# Patient Record
Sex: FEMALE | Race: WHITE | NOT HISPANIC OR LATINO | Employment: STUDENT | URBAN - METROPOLITAN AREA
[De-identification: names, ages, dates, MRNs, and addresses within clinical notes are randomized per-mention and may not be internally consistent; named-entity substitution may affect disease eponyms.]

---

## 2022-11-14 ENCOUNTER — OFFICE VISIT (OUTPATIENT)
Dept: URGENT CARE | Facility: CLINIC | Age: 22
End: 2022-11-14
Payer: COMMERCIAL

## 2022-11-14 VITALS
RESPIRATION RATE: 16 BRPM | BODY MASS INDEX: 23.04 KG/M2 | HEART RATE: 89 BPM | WEIGHT: 130 LBS | HEIGHT: 63 IN | TEMPERATURE: 99 F | OXYGEN SATURATION: 99 % | DIASTOLIC BLOOD PRESSURE: 74 MMHG | SYSTOLIC BLOOD PRESSURE: 126 MMHG

## 2022-11-14 DIAGNOSIS — S93.402A SPRAIN OF LEFT ANKLE, UNSPECIFIED LIGAMENT, INITIAL ENCOUNTER: ICD-10-CM

## 2022-11-14 DIAGNOSIS — S99.912A INJURY OF LEFT ANKLE, INITIAL ENCOUNTER: Primary | ICD-10-CM

## 2022-11-14 PROCEDURE — 3008F BODY MASS INDEX DOCD: CPT | Mod: CPTII,S$GLB,, | Performed by: NURSE PRACTITIONER

## 2022-11-14 PROCEDURE — 1159F MED LIST DOCD IN RCRD: CPT | Mod: CPTII,S$GLB,, | Performed by: NURSE PRACTITIONER

## 2022-11-14 PROCEDURE — 3008F PR BODY MASS INDEX (BMI) DOCUMENTED: ICD-10-PCS | Mod: CPTII,S$GLB,, | Performed by: NURSE PRACTITIONER

## 2022-11-14 PROCEDURE — 3078F PR MOST RECENT DIASTOLIC BLOOD PRESSURE < 80 MM HG: ICD-10-PCS | Mod: CPTII,S$GLB,, | Performed by: NURSE PRACTITIONER

## 2022-11-14 PROCEDURE — 3074F SYST BP LT 130 MM HG: CPT | Mod: CPTII,S$GLB,, | Performed by: NURSE PRACTITIONER

## 2022-11-14 PROCEDURE — 99204 PR OFFICE/OUTPT VISIT, NEW, LEVL IV, 45-59 MIN: ICD-10-PCS | Mod: S$GLB,,, | Performed by: NURSE PRACTITIONER

## 2022-11-14 PROCEDURE — 1160F RVW MEDS BY RX/DR IN RCRD: CPT | Mod: CPTII,S$GLB,, | Performed by: NURSE PRACTITIONER

## 2022-11-14 PROCEDURE — 1160F PR REVIEW ALL MEDS BY PRESCRIBER/CLIN PHARMACIST DOCUMENTED: ICD-10-PCS | Mod: CPTII,S$GLB,, | Performed by: NURSE PRACTITIONER

## 2022-11-14 PROCEDURE — 3074F PR MOST RECENT SYSTOLIC BLOOD PRESSURE < 130 MM HG: ICD-10-PCS | Mod: CPTII,S$GLB,, | Performed by: NURSE PRACTITIONER

## 2022-11-14 PROCEDURE — 73610 XR ANKLE COMPLETE 3 VIEW LEFT: ICD-10-PCS | Mod: LT,S$GLB,, | Performed by: RADIOLOGY

## 2022-11-14 PROCEDURE — 1159F PR MEDICATION LIST DOCUMENTED IN MEDICAL RECORD: ICD-10-PCS | Mod: CPTII,S$GLB,, | Performed by: NURSE PRACTITIONER

## 2022-11-14 PROCEDURE — 99204 OFFICE O/P NEW MOD 45 MIN: CPT | Mod: S$GLB,,, | Performed by: NURSE PRACTITIONER

## 2022-11-14 PROCEDURE — 3078F DIAST BP <80 MM HG: CPT | Mod: CPTII,S$GLB,, | Performed by: NURSE PRACTITIONER

## 2022-11-14 PROCEDURE — 73610 X-RAY EXAM OF ANKLE: CPT | Mod: LT,S$GLB,, | Performed by: RADIOLOGY

## 2022-11-14 RX ORDER — IBUPROFEN 800 MG/1
800 TABLET ORAL 2 TIMES DAILY PRN
Qty: 30 TABLET | Refills: 0 | Status: SHIPPED | OUTPATIENT
Start: 2022-11-14 | End: 2022-11-24

## 2022-11-14 RX ORDER — IBUPROFEN 200 MG
600 TABLET ORAL
Status: COMPLETED | OUTPATIENT
Start: 2022-11-14 | End: 2022-11-14

## 2022-11-14 RX ORDER — LEVONORGESTREL AND ETHINYL ESTRADIOL 0.1-0.02MG
1 KIT ORAL DAILY
COMMUNITY

## 2022-11-14 RX ADMIN — Medication 600 MG: at 03:11

## 2022-11-14 NOTE — PROGRESS NOTES
"Subjective:       Patient ID: Jeanie Nava is a 22 y.o. female.    Vitals:  height is 5' 3" (1.6 m) and weight is 59 kg (130 lb). Her oral temperature is 98.9 °F (37.2 °C). Her blood pressure is 126/74 and her pulse is 89. Her respiration is 16 and oxygen saturation is 99%.     Chief Complaint: Ankle Injury      22yr old female patient presented here today w/Lt ankle injury. Pt states that she was walking her dog on the pavement & pt miss her step & twisted her ankle about 1hr ago,. Pt states that she heard a popping sound from her ankle & the back of her heel is painful as well. Pt have not tried any ice or pain med's at this time.       Ankle Injury   The incident occurred less than 1 hour ago. The incident occurred at home. The injury mechanism was a twisting injury. The pain is present in the left ankle. The quality of the pain is described as aching and shooting. The pain is at a severity of 8/10. The pain is moderate. The pain has been Constant since onset. Associated symptoms include an inability to bear weight, a loss of motion, muscle weakness and tingling. Pertinent negatives include no loss of sensation or numbness. She reports no foreign bodies present. The symptoms are aggravated by movement and weight bearing. She has tried nothing for the symptoms.     Constitution: Negative for chills and fever.   HENT:  Negative for nosebleeds.    Neck: Negative for neck stiffness.   Cardiovascular:  Negative for leg swelling.   Eyes:  Negative for eye redness.   Respiratory:  Negative for cough and shortness of breath.    Gastrointestinal:  Negative for nausea and vomiting.   Musculoskeletal:  Positive for pain, joint pain, joint swelling and abnormal ROM of joint.   Skin:  Negative for rash and wound.   Allergic/Immunologic: Negative for immunocompromised state.   Neurological:  Negative for dizziness, altered mental status and numbness.   Hematologic/Lymphatic: Negative for easy bruising/bleeding. Does not " bruise/bleed easily.   Psychiatric/Behavioral:  Negative for altered mental status.      Objective:      Physical Exam   HENT:   Head: Normocephalic and atraumatic.   Abdominal: Normal appearance.   Musculoskeletal:      Left ankle: She exhibits decreased range of motion and swelling. She exhibits no ecchymosis, no laceration and normal pulse. Tenderness. Lateral malleolus tenderness found.   Neurological: She is alert.   Skin: not left ankle  Nursing note and vitals reviewed.      Assessment:       1. Injury of left ankle, initial encounter    2. Sprain of left ankle, unspecified ligament, initial encounter          Plan:         Injury of left ankle, initial encounter  -     ibuprofen tablet 600 mg  -     X-Ray Ankle Complete Left; Future; Expected date: 11/14/2022  -     AIR CAST WALKER BOOT FOR HOME USE  -     ibuprofen (ADVIL,MOTRIN) 800 MG tablet; Take 1 tablet (800 mg total) by mouth 2 (two) times daily as needed for Pain.  Dispense: 30 tablet; Refill: 0    Sprain of left ankle, unspecified ligament, initial encounter  -     AIR CAST WALKER BOOT FOR HOME USE  -     ibuprofen (ADVIL,MOTRIN) 800 MG tablet; Take 1 tablet (800 mg total) by mouth 2 (two) times daily as needed for Pain.  Dispense: 30 tablet; Refill: 0       Discussed PRICE therapy:  Protect the joint with stabilizing brace or taping  Rest the extremity  Ice as many times a day for 20 minute intervals for first 48 hours or until inflammation has stabilized   Compression to provide support and help prevent swelling  Elevation above heart level to help decrease swelling  For pain, may take Ibuprofen or Naproxen  Counseled the  patient on RICE treatment and over the counter NSAIDS. Told to  avoid aggravating factors. Follow up with PCP if no improvement in a week. Follow up sooner if he develops, numbness, tingling, color change, or severe pain.

## 2023-09-14 DIAGNOSIS — M25.572 LEFT ANKLE PAIN, UNSPECIFIED CHRONICITY: Primary | ICD-10-CM

## 2023-09-15 ENCOUNTER — OFFICE VISIT (OUTPATIENT)
Dept: ORTHOPEDICS | Facility: CLINIC | Age: 23
End: 2023-09-15
Payer: COMMERCIAL

## 2023-09-15 ENCOUNTER — HOSPITAL ENCOUNTER (OUTPATIENT)
Dept: RADIOLOGY | Facility: HOSPITAL | Age: 23
Discharge: HOME | End: 2023-09-15
Attending: ORTHOPAEDIC SURGERY
Payer: COMMERCIAL

## 2023-09-15 VITALS — BODY MASS INDEX: 23.04 KG/M2 | WEIGHT: 130 LBS | RESPIRATION RATE: 18 BRPM | HEIGHT: 63 IN

## 2023-09-15 DIAGNOSIS — M25.372 ANKLE INSTABILITY, LEFT: Primary | ICD-10-CM

## 2023-09-15 DIAGNOSIS — M25.572 LEFT ANKLE PAIN, UNSPECIFIED CHRONICITY: ICD-10-CM

## 2023-09-15 PROCEDURE — 1160F PR REVIEW ALL MEDS BY PRESCRIBER/CLIN PHARMACIST DOCUMENTED: ICD-10-PCS | Mod: CPTII,S$GLB,, | Performed by: ORTHOPAEDIC SURGERY

## 2023-09-15 PROCEDURE — 1159F PR MEDICATION LIST DOCUMENTED IN MEDICAL RECORD: ICD-10-PCS | Mod: CPTII,S$GLB,, | Performed by: ORTHOPAEDIC SURGERY

## 2023-09-15 PROCEDURE — 99204 OFFICE O/P NEW MOD 45 MIN: CPT | Mod: S$GLB,,, | Performed by: ORTHOPAEDIC SURGERY

## 2023-09-15 PROCEDURE — 99204 PR OFFICE/OUTPT VISIT, NEW, LEVL IV, 45-59 MIN: ICD-10-PCS | Mod: S$GLB,,, | Performed by: ORTHOPAEDIC SURGERY

## 2023-09-15 PROCEDURE — 1159F MED LIST DOCD IN RCRD: CPT | Mod: CPTII,S$GLB,, | Performed by: ORTHOPAEDIC SURGERY

## 2023-09-15 PROCEDURE — 73610 X-RAY EXAM OF ANKLE: CPT | Mod: TC,PO,LT

## 2023-09-15 PROCEDURE — 99999 PR PBB SHADOW E&M-EST. PATIENT-LVL III: ICD-10-PCS | Mod: PBBFAC,,, | Performed by: ORTHOPAEDIC SURGERY

## 2023-09-15 PROCEDURE — 99999 PR PBB SHADOW E&M-EST. PATIENT-LVL III: CPT | Mod: PBBFAC,,, | Performed by: ORTHOPAEDIC SURGERY

## 2023-09-15 PROCEDURE — 73610 XR ANKLE COMPLETE 3 VIEW LEFT: ICD-10-PCS | Mod: 26,LT,, | Performed by: RADIOLOGY

## 2023-09-15 PROCEDURE — 73610 X-RAY EXAM OF ANKLE: CPT | Mod: 26,LT,, | Performed by: RADIOLOGY

## 2023-09-15 PROCEDURE — 1160F RVW MEDS BY RX/DR IN RCRD: CPT | Mod: CPTII,S$GLB,, | Performed by: ORTHOPAEDIC SURGERY

## 2023-09-15 PROCEDURE — 3008F BODY MASS INDEX DOCD: CPT | Mod: CPTII,S$GLB,, | Performed by: ORTHOPAEDIC SURGERY

## 2023-09-15 PROCEDURE — 3008F PR BODY MASS INDEX (BMI) DOCUMENTED: ICD-10-PCS | Mod: CPTII,S$GLB,, | Performed by: ORTHOPAEDIC SURGERY

## 2023-09-15 NOTE — PROGRESS NOTES
Subjective:      Patient ID: Jeanie Nava is a 22 y.o. female.    Chief Complaint: Pain of the Left Ankle    HPI  22-year-old female long history of intermittent problems with her left ankle.  She states that these started back in high school.  She is had 3 additional sprains and giving way episodes over the last 8-10 months.  Most recently about a week ago.  She is had swelling and bruising with these episodes.  Last several episodes she was simply walking in her ankle gave way.  It sounds he is had no specific treatment for her ankle sprains.  ROS      Objective:    Ortho Exam     Constitutional:   Patient is alert  and oriented in no acute distress  HEENT:  normocephalic atraumatic; PERRL EOMI  Neck:  Supple without adenopathy  Cardiovascular:  Normal rate and rhythm  Pulmonary:  Normal respiratory effort normal chest wall expansion  Abdominal:  Nonprotuberant nondistended  Musculoskeletal:  Patient has diffuse tenderness mild swelling quite a bit of guarding upon examination of the left lateral ankle.  She has a  One to 2+ anterior drawer she has some generalized ligamentous laxity with hyper extension of her elbows and her knees  Neurological:  No focal defect; cranial nerves 2-12 grossly intact  Psychiatric/behavioral:  Mood and behavior normal      X-Ray Ankle Complete Left  Narrative: EXAMINATION:  XR ANKLE COMPLETE 3 VIEW LEFT    INDICATION:  Unspecified injury of left ankle, initial encounter    COMPARISON:  None    FINDINGS:  AP, oblique and lateral views of the left ankle are obtained.  Soft tissue edema is present around the lateral malleolus.  No underlying fracture or or dislocation.  Talar dome is normal.  Ankle mortise is preserved.  Impression: 1. Soft tissue swelling.  No underlying fracture or dislocation.    Electronically signed by: Alverto Guaman MD  Date:    11/14/2022  Time:    16:04       My Radiographs Findings:    I have personally reviewed radiographs and concur with above  findings    Assessment:       Encounter Diagnosis   Name Primary?    Ankle instability, left Yes         Plan:       I have discussed medical condition treatment options with her at length.  She is content with a trial of conservative treatment to include lace-up ankle bracing activity restrictions and physical therapy along with NSAIDs etc..  Follow up with me in 4-6 weeks I will see her sooner if any questions or problems.        History reviewed. No pertinent past medical history.  Past Surgical History:   Procedure Laterality Date    WISDOM TOOTH EXTRACTION           Current Outpatient Medications:     levonorgestrel-ethinyl estradiol (AVIANE,ALESSE,LESSINA) 0.1-20 mg-mcg per tablet, Take 1 tablet by mouth once daily., Disp: , Rfl:     Review of patient's allergies indicates:  No Known Allergies    History reviewed. No pertinent family history.  Social History     Occupational History    Not on file   Tobacco Use    Smoking status: Never    Smokeless tobacco: Never   Substance and Sexual Activity    Alcohol use: Yes     Comment: socially    Drug use: Not on file    Sexual activity: Not Currently     Partners: Male

## 2024-02-21 ENCOUNTER — TELEPHONE (OUTPATIENT)
Dept: UROLOGY | Facility: CLINIC | Age: 24
End: 2024-02-21
Payer: COMMERCIAL

## 2024-02-21 NOTE — TELEPHONE ENCOUNTER
----- Message from Marlys Torres sent at 2/21/2024  2:22 PM CST -----  Contact: Lea [phone]  Type: Needs Medical Advice  Who Called:  Pts Mother Lea     Best Call Back Number: [phone]    Additional Information: Pt has appt tomorrow. Pt had us done at [facility] today but Lea stated she has not heard back from office about getting the records. She is asking if providers office can request the us results from [facility] or is it even needed. Pls call back and advise

## 2024-02-21 NOTE — TELEPHONE ENCOUNTER
Spoke with Lea, patient's mother. Explained she will need to have patient go  the disc and report from [facility] either today or tomorrow before appt and bring to office visit. Patient's mother voiced understanding

## 2024-02-22 ENCOUNTER — TELEPHONE (OUTPATIENT)
Dept: UROLOGY | Facility: CLINIC | Age: 24
End: 2024-02-22
Payer: COMMERCIAL

## 2024-02-22 ENCOUNTER — HOSPITAL ENCOUNTER (EMERGENCY)
Facility: HOSPITAL | Age: 24
Discharge: HOME | End: 2024-02-22
Attending: EMERGENCY MEDICINE
Payer: COMMERCIAL

## 2024-02-22 VITALS
DIASTOLIC BLOOD PRESSURE: 65 MMHG | TEMPERATURE: 99 F | OXYGEN SATURATION: 99 % | SYSTOLIC BLOOD PRESSURE: 106 MMHG | WEIGHT: 130 LBS | HEART RATE: 69 BPM | RESPIRATION RATE: 16 BRPM | BODY MASS INDEX: 23.03 KG/M2

## 2024-02-22 DIAGNOSIS — N39.0 URINARY TRACT INFECTION WITH HEMATURIA, SITE UNSPECIFIED: Primary | ICD-10-CM

## 2024-02-22 DIAGNOSIS — R31.9 URINARY TRACT INFECTION WITH HEMATURIA, SITE UNSPECIFIED: Primary | ICD-10-CM

## 2024-02-22 DIAGNOSIS — R10.9 RIGHT FLANK PAIN: ICD-10-CM

## 2024-02-22 LAB
ALBUMIN SERPL BCP-MCNC: 4.7 G/DL (ref 3.5–5.2)
ALP SERPL-CCNC: 44 U/L (ref 55–135)
ALT SERPL W/O P-5'-P-CCNC: 9 U/L (ref 10–44)
ANION GAP SERPL CALC-SCNC: 8 MMOL/L (ref 8–16)
AST SERPL-CCNC: 15 U/L (ref 10–40)
B-HCG UR QL: NEGATIVE
BACTERIA #/AREA URNS HPF: NORMAL /HPF
BASOPHILS # BLD AUTO: 0.09 K/UL (ref 0–0.2)
BASOPHILS NFR BLD: 1.3 % (ref 0–1.9)
BILIRUB SERPL-MCNC: 0.4 MG/DL (ref 0.1–1)
BILIRUB UR QL STRIP: NEGATIVE
BUN SERPL-MCNC: 11 MG/DL (ref 6–20)
CALCIUM SERPL-MCNC: 9.6 MG/DL (ref 8.7–10.5)
CAOX CRY URNS QL MICRO: NORMAL
CHLORIDE SERPL-SCNC: 104 MMOL/L (ref 95–110)
CLARITY UR: CLEAR
CO2 SERPL-SCNC: 25 MMOL/L (ref 23–29)
COLOR UR: YELLOW
CREAT SERPL-MCNC: 0.7 MG/DL (ref 0.5–1.4)
CTP QC/QA: YES
DIFFERENTIAL METHOD BLD: NORMAL
EOSINOPHIL # BLD AUTO: 0.1 K/UL (ref 0–0.5)
EOSINOPHIL NFR BLD: 1.8 % (ref 0–8)
ERYTHROCYTE [DISTWIDTH] IN BLOOD BY AUTOMATED COUNT: 13.5 % (ref 11.5–14.5)
EST. GFR  (NO RACE VARIABLE): >60 ML/MIN/1.73 M^2
GLUCOSE SERPL-MCNC: 74 MG/DL (ref 70–110)
GLUCOSE UR QL STRIP: NEGATIVE
HCT VFR BLD AUTO: 40.7 % (ref 37–48.5)
HGB BLD-MCNC: 13.1 G/DL (ref 12–16)
HGB UR QL STRIP: ABNORMAL
IMM GRANULOCYTES # BLD AUTO: 0.01 K/UL (ref 0–0.04)
IMM GRANULOCYTES NFR BLD AUTO: 0.1 % (ref 0–0.5)
KETONES UR QL STRIP: ABNORMAL
LEUKOCYTE ESTERASE UR QL STRIP: NEGATIVE
LYMPHOCYTES # BLD AUTO: 1.7 K/UL (ref 1–4.8)
LYMPHOCYTES NFR BLD: 24.3 % (ref 18–48)
MCH RBC QN AUTO: 31 PG (ref 27–31)
MCHC RBC AUTO-ENTMCNC: 32.2 G/DL (ref 32–36)
MCV RBC AUTO: 96 FL (ref 82–98)
MICROSCOPIC COMMENT: NORMAL
MONOCYTES # BLD AUTO: 0.6 K/UL (ref 0.3–1)
MONOCYTES NFR BLD: 8.4 % (ref 4–15)
NEUTROPHILS # BLD AUTO: 4.6 K/UL (ref 1.8–7.7)
NEUTROPHILS NFR BLD: 64.1 % (ref 38–73)
NITRITE UR QL STRIP: NEGATIVE
NRBC BLD-RTO: 0 /100 WBC
PH UR STRIP: 6 [PH] (ref 5–8)
PLATELET # BLD AUTO: 434 K/UL (ref 150–450)
PMV BLD AUTO: 9.3 FL (ref 9.2–12.9)
POTASSIUM SERPL-SCNC: 4 MMOL/L (ref 3.5–5.1)
PROT SERPL-MCNC: 8.2 G/DL (ref 6–8.4)
PROT UR QL STRIP: NEGATIVE
RBC # BLD AUTO: 4.23 M/UL (ref 4–5.4)
RBC #/AREA URNS HPF: 0 /HPF (ref 0–4)
SODIUM SERPL-SCNC: 137 MMOL/L (ref 136–145)
SP GR UR STRIP: 1.02 (ref 1–1.03)
SQUAMOUS #/AREA URNS HPF: 30 /HPF
URN SPEC COLLECT METH UR: ABNORMAL
UROBILINOGEN UR STRIP-ACNC: NEGATIVE EU/DL
WBC # BLD AUTO: 7.12 K/UL (ref 3.9–12.7)
WBC #/AREA URNS HPF: 2 /HPF (ref 0–5)

## 2024-02-22 PROCEDURE — 80053 COMPREHEN METABOLIC PANEL: CPT | Performed by: NURSE PRACTITIONER

## 2024-02-22 PROCEDURE — 81001 URINALYSIS AUTO W/SCOPE: CPT | Performed by: NURSE PRACTITIONER

## 2024-02-22 PROCEDURE — 99285 EMERGENCY DEPT VISIT HI MDM: CPT | Mod: 25

## 2024-02-22 PROCEDURE — 25000003 PHARM REV CODE 250: Performed by: NURSE PRACTITIONER

## 2024-02-22 PROCEDURE — 85025 COMPLETE CBC W/AUTO DIFF WBC: CPT | Performed by: NURSE PRACTITIONER

## 2024-02-22 PROCEDURE — 81025 URINE PREGNANCY TEST: CPT | Performed by: NURSE PRACTITIONER

## 2024-02-22 PROCEDURE — 96374 THER/PROPH/DIAG INJ IV PUSH: CPT

## 2024-02-22 PROCEDURE — 63600175 PHARM REV CODE 636 W HCPCS: Performed by: NURSE PRACTITIONER

## 2024-02-22 PROCEDURE — 96361 HYDRATE IV INFUSION ADD-ON: CPT

## 2024-02-22 PROCEDURE — 96375 TX/PRO/DX INJ NEW DRUG ADDON: CPT

## 2024-02-22 RX ORDER — KETOROLAC TROMETHAMINE 30 MG/ML
10 INJECTION, SOLUTION INTRAMUSCULAR; INTRAVENOUS
Status: COMPLETED | OUTPATIENT
Start: 2024-02-22 | End: 2024-02-22

## 2024-02-22 RX ORDER — ONDANSETRON HYDROCHLORIDE 2 MG/ML
4 INJECTION, SOLUTION INTRAVENOUS
Status: COMPLETED | OUTPATIENT
Start: 2024-02-22 | End: 2024-02-22

## 2024-02-22 RX ADMIN — ONDANSETRON 4 MG: 2 INJECTION INTRAMUSCULAR; INTRAVENOUS at 11:02

## 2024-02-22 RX ADMIN — KETOROLAC TROMETHAMINE 10 MG: 30 INJECTION, SOLUTION INTRAMUSCULAR; INTRAVENOUS at 11:02

## 2024-02-22 RX ADMIN — SODIUM CHLORIDE 1000 ML: 9 INJECTION, SOLUTION INTRAVENOUS at 11:02

## 2024-02-22 NOTE — DISCHARGE INSTRUCTIONS
Please follow up with Urology as soon as possible further evaluation rechecked.  Please continue to take your antibiotics as prescribed until gone.  Please return to the ED for worsening back or abdominal pain, persistent vomiting, diarrhea, fever, new or worsening concerns.

## 2024-02-22 NOTE — Clinical Note
"Jeanie Toledo" Elijah was seen and treated in our emergency department on 2/22/2024.  She may return to school on 02/26/2024.      If you have any questions or concerns, please don't hesitate to call.      Asha Shipman NP"

## 2024-02-22 NOTE — TELEPHONE ENCOUNTER
Spoke with patient's mother, Lea, explained patient will need to go to the ED at [facility] for treatment of kidney stone and UTI. Patient's mother voiced understanding . Explained to bring all imaging and lab records to ED for MD review.

## 2024-02-22 NOTE — FIRST PROVIDER EVALUATION
Emergency Department TeleTriage Encounter Note      CHIEF COMPLAINT    Chief Complaint   Patient presents with    Dysuria     Pt seen at urgent care and dx with uti, pt has kidney stone and was told by urology to come to er.        VITAL SIGNS   Initial Vitals [02/22/24 1028]   BP Pulse Resp Temp SpO2   128/87 83 18 98.1 °F (36.7 °C) 97 %      MAP       --            ALLERGIES    Review of patient's allergies indicates:  No Known Allergies    PROVIDER TRIAGE NOTE  This is a teletriage evaluation of a 23 y.o. female presenting to the ED complaining of kidney stone with UTI. Pt is currently on abx.  Pain started a few days ago. Pt was advised by urology to go to ED today. Pt denies fever.    Alert, no distress.     Initial orders will be placed and care will be transferred to an alternate provider when patient is roomed for a full evaluation. Any additional orders and the final disposition will be determined by that provider.         ORDERS  Labs Reviewed - No data to display    ED Orders (720h ago, onward)      Start Ordered     Status Ordering Provider    02/22/24 1045 02/22/24 1040  ketorolac injection 9.999 mg  ED 1 Time         Ordered LINCOLN BRAVO N.    02/22/24 1045 02/22/24 1040  ondansetron injection 4 mg  ED 1 Time         Ordered LINCOLN BRAVO N.    02/22/24 1045 02/22/24 1040  sodium chloride 0.9% bolus 1,000 mL 1,000 mL  ED 1 Time         Ordered LINCOLN BRAVO NElmer    02/22/24 1041 02/22/24 1040  POCT urine pregnancy  Once         Ordered LINCOLN BRAVO N.    02/22/24 1040 02/22/24 1040  Saline lock IV  Once         Ordered LINCOLN BRAVO    02/22/24 1040 02/22/24 1040  CBC auto differential  STAT         Ordered LINCOLN BRAVO.    02/22/24 1040 02/22/24 1040  Comprehensive metabolic panel  STAT         Ordered LINCOLN BRAVO    02/22/24 1040 02/22/24 1040  Urinalysis, Reflex to Urine Culture Urine, Clean Catch  STAT         Ordered  LINCOLN BRAVO N.    02/22/24 1040 02/22/24 1040  POCT urine pregnancy  Once         Ordered LINCOLN BRAVO              Virtual Visit Note: The provider triage portion of this emergency department evaluation and documentation was performed via [employer], a HIPAA-compliant telemedicine application, in concert with a tele-presenter in the room. A face to face patient evaluation with one of my colleagues will occur once the patient is placed in an emergency department room.      DISCLAIMER: This note was prepared with [employer] voice recognition transcription software. Garbled syntax, mangled pronouns, and other bizarre constructions may be attributed to that software system.

## 2024-02-23 NOTE — ED PROVIDER NOTES
Encounter Date: 2/22/2024       History     Chief Complaint   Patient presents with    Dysuria     Pt seen at urgent care and dx with uti, pt has kidney stone and was told by urology to come to er.      Patient is a 23 y.o. female with no significant past medical history who presents to ED via family for concern for right flank pain which began 5 day(s) ago.  Patient reports he was Sunday she had a fever T-max 101.5°.  Patient reports she was also having right flank pain in her upper back and upper abdomen.  Patient reports last time she had fever was Monday.  Patient reports she went to an urgent care on Monday and they diagnosed her with a UTI.  Patient had a scheduled outpatient ultrasound that is performed yesterday and was told that she possibly had a kidney stone on the right.  Patient made a follow up appointment with Urology.  Patient reports the urologist called her today and recommended she come to the ER for evaluation due to having a urinary tract infection with a possible kidney stone.  Patient reports she was currently taking Augmentin for her UTI.  Patient denies dysuria but reports frequency.  Patient denies blood in her urine.  Patient reports nausea but denies vomiting or diarrhea.  Patient reports she is currently on her menstrual cycle.  Patient is awake and alert in no acute distress.        Review of patient's allergies indicates:  No Known Allergies  No past medical history on file.  Past Surgical History:   Procedure Laterality Date    WISDOM TOOTH EXTRACTION       No family history on file.  Social History     Tobacco Use    Smoking status: Never    Smokeless tobacco: Never   Substance Use Topics    Alcohol use: Yes     Comment: socially     Review of Systems   Constitutional: Negative.  Negative for fever.   HENT: Negative.     Respiratory: Negative.  Negative for cough and shortness of breath.    Cardiovascular:  Negative for chest pain.   Gastrointestinal:  Positive for nausea. Negative  for abdominal pain, diarrhea and vomiting.   Genitourinary:  Positive for flank pain and frequency. Negative for decreased urine volume, difficulty urinating, dysuria, menstrual problem and pelvic pain.   Musculoskeletal:  Negative for back pain, neck pain and neck stiffness.   Skin:  Negative for color change, pallor and rash.   Neurological: Negative.  Negative for weakness.   Hematological:  Does not bruise/bleed easily.   Psychiatric/Behavioral: Negative.         Physical Exam     Initial Vitals [02/22/24 1028]   BP Pulse Resp Temp SpO2   128/87 83 18 98.1 °F (36.7 °C) 97 %      MAP       --         Physical Exam    Nursing note and vitals reviewed.  Constitutional: She appears well-developed and well-nourished. She is not diaphoretic. No distress.   HENT:   Head: Normocephalic and atraumatic.   Right Ear: External ear normal.   Left Ear: External ear normal.   Nose: Nose normal.   Eyes: EOM are normal.   Neck:   Normal range of motion.  Cardiovascular:  Normal rate, regular rhythm, normal heart sounds and intact distal pulses.     Exam reveals no gallop and no friction rub.       No murmur heard.  Pulmonary/Chest: Breath sounds normal. No respiratory distress. She has no wheezes. She has no rhonchi. She has no rales. She exhibits no tenderness.   Abdominal: Abdomen is soft and flat. Bowel sounds are normal. She exhibits no distension. There is abdominal tenderness in the right upper quadrant.   There is right CVA tenderness.  No left CVA tenderness. There is no guarding.   Musculoskeletal:         General: Normal range of motion.      Cervical back: Normal range of motion.     Neurological: She is alert and oriented to person, place, and time. She has normal strength. GCS score is 15. GCS eye subscore is 4. GCS verbal subscore is 5. GCS motor subscore is 6.   Skin: Skin is warm and dry. Capillary refill takes less than 2 seconds.   Psychiatric: She has a normal mood and affect. Her behavior is normal. Judgment  and thought content normal.         ED Course   Procedures  Labs Reviewed   COMPREHENSIVE METABOLIC PANEL - Abnormal; Notable for the following components:       Result Value    Alkaline Phosphatase 44 (*)     ALT 9 (*)     All other components within normal limits   URINALYSIS, REFLEX TO URINE CULTURE - Abnormal; Notable for the following components:    Ketones, UA 1+ (*)     Occult Blood UA 1+ (*)     All other components within normal limits    Narrative:     Specimen Source->Urine   CBC W/ AUTO DIFFERENTIAL   URINALYSIS MICROSCOPIC    Narrative:     Specimen Source->Urine   POCT URINE PREGNANCY          Imaging Results              CT Renal Stone Study ABD Pelvis WO (Final result)  Result time 02/22/24 15:36:01      Final result by Kim Teixeira IV, MD (02/22/24 15:36:01)                   Narrative:    CMS MANDATED QUALITY DATA - CT RADIATION 436    All CT scans at this facility utilize dose modulation, iterative reconstruction, and/or weight based dosing when appropriate to reduce radiation dose to as low as reasonably achievable.    CT of the abdomen without contrast and CT of the pelvis without contrast    HISTORY: Flank pain.    No prior examination is available for comparison purposes.  There are no discrete renal or ureteral calculi identified. There is mild perinephric stranding about the right kidney and proximal segment of the right ureter, a nonspecific appearance which could relate to urinary tract infection/pyelonephritis. Correlate clinically. There is no significant hydronephrosis. The kidneys are normal in size and position. The urinary bladder is nondistended but grossly unremarkable.    The unenhanced liver and spleen are normal in size. No definite focal parenchymal abnormalities are identified. The gallbladder is incompletely distended. No biliary dilatation is observed. No pancreatic or adrenal abnormality identified. The aorta tapers appropriately. There is no adenopathy. There is no  discrete bowel wall thickening. There are some scattered colonic air-fluid levels. The appendix appears unremarkable. There is no evidence of obstruction.    The uterus and adnexa appear unremarkable.    There is bilateral L5 spondylolysis. The visualized lung bases are appropriately aerated.    IMPRESSION:    Perinephric stranding about the right kidney proximal segment of the right ureter. Correlate with evidence of urinary tract infection/pyelonephritis.    No discrete renal or ureteral calculi identified.    There are a few scattered colonic air-fluid levels    Electronically signed by:  Kim Teixeira MD  02/22/2024 03:36 PM CST Workstation: [id number]                                     Medications   ketorolac injection 9.999 mg (9.999 mg Intravenous Given 2/22/24 1126)   ondansetron injection 4 mg (4 mg Intravenous Given 2/22/24 1126)   sodium chloride 0.9% bolus 1,000 mL 1,000 mL (0 mLs Intravenous Stopped 2/22/24 1218)     Medical Decision Making  MDM    Patient presents for emergent evaluation of acute flank pain that poses a possible threat to life and/or bodily function.    Differential diagnosis included but not limited to urinary tract infection, pyelonephritis, obstructing kidney stone, decreased kidney function, electrolyte abnormality.  In the ED patient found to have acute clear lung sounds bilaterally with no increased work of breathing.  Patient has right CVA tenderness with right upper quadrant tenderness to palpation.  Patient has normal bowel sounds in all 4 quadrants.  Patient reports her pain and nausea is better after medications.  Patient is awake and alert in no acute distress.      Discharge MDM  I discussed the patient presentation labs, CT findings with my attending Dr. Melissa.    Patient was managed in the ED with IV normal saline, Toradol, and Zofran.    The response to treatment was improved pain.  UA significant for 1+ ketones 1+ occult blood, negative leukocytes and negative  nitrites and 2 WBC.  Patient is currently on Augmentin for UTI.  Patient's WBC 7.12.  Patient's kidney function is within normal limits on CMP.  No signs of a kidney stone on CT with some stranding around the right kidney.  Patient was afebrile in the ED.  Patient was awake and alert no acute distress.  Patient safe for discharge home at this time with close follow up with Urology.  Patient was discharged in stable condition with close follow up.  Detailed return precautions discussed to return to the ED for severe abdominal pain, worsening flank pain, fever, inability to urinate, blood in the urine, persistent vomiting diarrhea, or any new or worsening concerns.  Patient and patient's family member states understanding.    NP uses Epic and voice recognition software prone to occasional and minor errors that may persist in the medical record.     Amount and/or Complexity of Data Reviewed  Labs: ordered. Decision-making details documented in ED Course.  Radiology: ordered. Decision-making details documented in ED Course.    Risk  OTC drugs.  Prescription drug management.               ED Course as of 02/22/24 1822   Thu Feb 22, 2024   1425 Creatinine: 0.7 [MP]   1426 BUN: 11 [MP]   1426 eGFR: >60.0 [MP]   1426 ALP(!): 44 [MP]   1426 ALT(!): 9 [MP]   1426 WBC: 7.12 [MP]   1426 Urinalysis, Reflex to Urine Culture Urine, Clean Catch(!)  UA significant for 1+ ketones, 1+ blood [MP]   1545 CT Renal Stone Study ABD Pelvis WO  IMPRESSION: Perinephric stranding about the right kidney proximal segment of the right ureter. Correlate with evidence of urinary tract infection/pyelonephritis. No discrete renal or ureteral calculi identified. There are a few scattered colonic air-fluid levels [MP]      ED Course User Index  [MP] Asha Shipman NP                           Clinical Impression:  Final diagnoses:  [N39.0, R31.9] Urinary tract infection with hematuria, site unspecified (Primary)  [R10.9] Right flank pain           ED Disposition Condition    Discharge Stable          ED Prescriptions    None       Follow-up Information       Follow up With Specialties Details Why Contact Info Additional Information    Az Quijano MD Urology Schedule an appointment as soon as possible for a visit  For recheck/continuing care [address] [facility]  [address]  [facility] [address]  [phone]       [facility] - Emergency Dept Emergency Medicine  If symptoms worsen [address] [facility] [id number]  [phone] 1st floor             Asha Shipman NP  02/22/24 9320

## 2024-03-04 ENCOUNTER — OFFICE VISIT (OUTPATIENT)
Dept: UROLOGY | Facility: CLINIC | Age: 24
End: 2024-03-04
Payer: COMMERCIAL

## 2024-03-04 VITALS
WEIGHT: 132.94 LBS | SYSTOLIC BLOOD PRESSURE: 106 MMHG | HEIGHT: 64 IN | DIASTOLIC BLOOD PRESSURE: 65 MMHG | BODY MASS INDEX: 22.7 KG/M2 | HEART RATE: 69 BPM

## 2024-03-04 DIAGNOSIS — N39.0 URINARY TRACT INFECTION WITHOUT HEMATURIA, SITE UNSPECIFIED: Primary | ICD-10-CM

## 2024-03-04 DIAGNOSIS — N12 PYELONEPHRITIS: ICD-10-CM

## 2024-03-04 LAB
BILIRUBIN, UA POC OHS: NEGATIVE
BLOOD, UA POC OHS: ABNORMAL
CLARITY, UA POC OHS: CLEAR
COLOR, UA POC OHS: YELLOW
GLUCOSE, UA POC OHS: NEGATIVE
KETONES, UA POC OHS: NEGATIVE
LEUKOCYTES, UA POC OHS: ABNORMAL
NITRITE, UA POC OHS: NEGATIVE
PH, UA POC OHS: 7
PROTEIN, UA POC OHS: NEGATIVE
SPECIFIC GRAVITY, UA POC OHS: 1.02
UROBILINOGEN, UA POC OHS: 0.2

## 2024-03-04 PROCEDURE — 99999 PR PBB SHADOW E&M-EST. PATIENT-LVL III: CPT | Mod: PBBFAC,,, | Performed by: NURSE PRACTITIONER

## 2024-03-04 PROCEDURE — 3008F BODY MASS INDEX DOCD: CPT | Mod: CPTII,S$GLB,, | Performed by: NURSE PRACTITIONER

## 2024-03-04 PROCEDURE — 87186 SC STD MICRODIL/AGAR DIL: CPT | Performed by: NURSE PRACTITIONER

## 2024-03-04 PROCEDURE — 1160F RVW MEDS BY RX/DR IN RCRD: CPT | Mod: CPTII,S$GLB,, | Performed by: NURSE PRACTITIONER

## 2024-03-04 PROCEDURE — 87086 URINE CULTURE/COLONY COUNT: CPT | Performed by: NURSE PRACTITIONER

## 2024-03-04 PROCEDURE — 3074F SYST BP LT 130 MM HG: CPT | Mod: CPTII,S$GLB,, | Performed by: NURSE PRACTITIONER

## 2024-03-04 PROCEDURE — 1159F MED LIST DOCD IN RCRD: CPT | Mod: CPTII,S$GLB,, | Performed by: NURSE PRACTITIONER

## 2024-03-04 PROCEDURE — 87088 URINE BACTERIA CULTURE: CPT | Performed by: NURSE PRACTITIONER

## 2024-03-04 PROCEDURE — 81003 URINALYSIS AUTO W/O SCOPE: CPT | Mod: QW,S$GLB,, | Performed by: NURSE PRACTITIONER

## 2024-03-04 PROCEDURE — 99204 OFFICE O/P NEW MOD 45 MIN: CPT | Mod: S$GLB,,, | Performed by: NURSE PRACTITIONER

## 2024-03-04 PROCEDURE — 87077 CULTURE AEROBIC IDENTIFY: CPT | Performed by: NURSE PRACTITIONER

## 2024-03-04 PROCEDURE — 3078F DIAST BP <80 MM HG: CPT | Mod: CPTII,S$GLB,, | Performed by: NURSE PRACTITIONER

## 2024-03-04 RX ORDER — PHENAZOPYRIDINE HYDROCHLORIDE 200 MG/1
200 TABLET, FILM COATED ORAL 3 TIMES DAILY PRN
Qty: 30 TABLET | Refills: 1 | Status: SHIPPED | OUTPATIENT
Start: 2024-03-04 | End: 2024-03-14

## 2024-03-04 RX ORDER — LEVOFLOXACIN 500 MG/1
500 TABLET, FILM COATED ORAL DAILY
Qty: 7 TABLET | Refills: 0 | Status: SHIPPED | OUTPATIENT
Start: 2024-03-04 | End: 2024-03-11

## 2024-03-04 NOTE — PROGRESS NOTES
[facility] Note  Staff: ARTEM Christianson    PCP: None    Chief Complaint: Urinary Tract Infection    Subjective:        HPI: Jeanie Nava is a 23 y.o. female NEW PT presents today for hospital ED f/up.    Pt initially was evaluated 2-3 weeks ago at [facility] for UTI and was treated with Augmentin at the time.    Pt's UTI symptoms worsened with fever, therefore was evaluated in ER on 2/22/24 for further treatment.  CT RSS was performed on 2/22/24 which showed:  Perinephric stranding about the right kidney proximal segment of the right ureter. Correlate with evidence of urinary tract infection/pyelonephritis.  No discrete renal or ureteral calculi identified.    OV 3/4/24:  UA showed moderate leukocytes, trace-intact blood (pt on menses)  No gross hematuria  Currently c/o Bladder spasms/pain with urination.  History of Kidney Stones?:  None  Constipation issues?:  None  Currently sexually active    REVIEW OF SYSTEMS:  General ROS: No fevers, no chills  Psychological ROS: no depression  Endocrine ROS: no diabetes  Respiratory ROS: no SOB  Cardiovascular ROS: no CP  Gastrointestinal ROS: no abdominal pain, no constipation, no diarrhea  Musculoskeletal ROS: no muscle pain  Neurological ROS: no headaches  Dermatological ROS: no rashes  HEENT: wears glasses, No sinus   ROS: per HPI    PMHx:  Past Medical History:   Diagnosis Date    Urinary tract infection      PSHx:  Past Surgical History:   Procedure Laterality Date    WISDOM TOOTH EXTRACTION       Allergies:  Patient has no known allergies.    Medications: reviewed   Objective:     Vitals:    03/04/24 1347   BP: 106/65   Pulse: 69     Physical Exam  Vitals reviewed.   Constitutional:       Appearance: She is well-developed.   HENT:      Head: Normocephalic and atraumatic.   Eyes:      Conjunctiva/sclera: Conjunctivae normal.      Pupils: Pupils are equal, round, and reactive to light.   Cardiovascular:      Rate and Rhythm: Normal  rate and regular rhythm.      Heart sounds: Normal heart sounds.   Pulmonary:      Effort: Pulmonary effort is normal.      Breath sounds: Normal breath sounds.   Abdominal:      General: Bowel sounds are normal.      Palpations: Abdomen is soft.   Musculoskeletal:         General: Normal range of motion.      Cervical back: Normal range of motion and neck supple.   Skin:     General: Skin is warm and dry.   Neurological:      Mental Status: She is alert and oriented to person, place, and time.      Deep Tendon Reflexes: Reflexes are normal and symmetric.   Psychiatric:         Behavior: Behavior normal.         Thought Content: Thought content normal.         Judgment: Judgment normal.         Assessment:       1. Urinary tract infection without hematuria, site unspecified    2. Pyelonephritis          Plan:      Urine Culture to be processed at this time.  In the meantime, will prescribe Levaquin 500 mg daily for possible remains of pyelonephritis at this time.  The med prescribed to pt today as trial to see if med improves pt's current LUTS.  Benefits, risks and side affects were thoroughly explained to pt today in office with all questions answered.  Pt was encouraged to increase p.o. fluid intake at this time.    F/u after treatment in 1-2 mos with repeat urine sample and Renal/Bladder US in order to recheck  system at that time.  Pt verbalized understanding.    [facility]: Active    [employer], ARTEM

## 2024-03-06 LAB — BACTERIA UR CULT: ABNORMAL

## 2024-03-07 DIAGNOSIS — N39.0 URINARY TRACT INFECTION WITHOUT HEMATURIA, SITE UNSPECIFIED: ICD-10-CM

## 2024-03-07 DIAGNOSIS — N12 PYELONEPHRITIS: Primary | ICD-10-CM

## 2024-04-04 ENCOUNTER — OFFICE VISIT (OUTPATIENT)
Dept: OBSTETRICS AND GYNECOLOGY | Facility: CLINIC | Age: 24
End: 2024-04-04
Payer: COMMERCIAL

## 2024-04-04 ENCOUNTER — LAB VISIT (OUTPATIENT)
Dept: LAB | Facility: HOSPITAL | Age: 24
End: 2024-04-04
Payer: COMMERCIAL

## 2024-04-04 VITALS
WEIGHT: 138.88 LBS | SYSTOLIC BLOOD PRESSURE: 128 MMHG | BODY MASS INDEX: 23.71 KG/M2 | DIASTOLIC BLOOD PRESSURE: 64 MMHG | HEIGHT: 64 IN

## 2024-04-04 DIAGNOSIS — Z01.419 WELL WOMAN EXAM: Primary | ICD-10-CM

## 2024-04-04 DIAGNOSIS — Z86.39 HX OF HASHIMOTO THYROIDITIS: ICD-10-CM

## 2024-04-04 DIAGNOSIS — Z12.4 CERVICAL CANCER SCREENING: ICD-10-CM

## 2024-04-04 LAB
T3FREE SERPL-MCNC: 2.7 PG/ML (ref 2.3–4.2)
TSH SERPL DL<=0.005 MIU/L-ACNC: 1.31 UIU/ML (ref 0.4–4)

## 2024-04-04 PROCEDURE — 88175 CYTOPATH C/V AUTO FLUID REDO: CPT

## 2024-04-04 PROCEDURE — 1160F RVW MEDS BY RX/DR IN RCRD: CPT | Mod: CPTII,S$GLB,,

## 2024-04-04 PROCEDURE — 36415 COLL VENOUS BLD VENIPUNCTURE: CPT | Mod: PN

## 2024-04-04 PROCEDURE — 99999 PR PBB SHADOW E&M-EST. PATIENT-LVL III: CPT | Mod: PBBFAC,,,

## 2024-04-04 PROCEDURE — 3074F SYST BP LT 130 MM HG: CPT | Mod: CPTII,S$GLB,,

## 2024-04-04 PROCEDURE — 3008F BODY MASS INDEX DOCD: CPT | Mod: CPTII,S$GLB,,

## 2024-04-04 PROCEDURE — 1159F MED LIST DOCD IN RCRD: CPT | Mod: CPTII,S$GLB,,

## 2024-04-04 PROCEDURE — 99385 PREV VISIT NEW AGE 18-39: CPT | Mod: S$GLB,,,

## 2024-04-04 PROCEDURE — 84481 FREE ASSAY (FT-3): CPT

## 2024-04-04 PROCEDURE — 3078F DIAST BP <80 MM HG: CPT | Mod: CPTII,S$GLB,,

## 2024-04-04 PROCEDURE — 84443 ASSAY THYROID STIM HORMONE: CPT

## 2024-04-04 NOTE — PROGRESS NOTES
HISTORY OF PRESENT ILLNESS:    Jeanie Nava is a 23 y.o. female, No obstetric history on file., Patient's last menstrual period was 03/09/2024.,  presents for a routine annual exam .   Last pap:  never   Gardisil?: Yes  Last mammogram: n/a  Last colon ca screening:  n/a   SA: male partner, does use condoms  Contraception: OCP (estrogen/progesterone).    Sexually transmitted infection risk: very low risk of STD exposure.   Menstrual flow: regular every 28-30 days.  This is the extent of the patient's complaints at this time.     Past Medical History:   Diagnosis Date    Hashimoto's disease 2012    Urinary tract infection        Past Surgical History:   Procedure Laterality Date    WISDOM TOOTH EXTRACTION         MEDICATIONS AND ALLERGIES:      Current Outpatient Medications:     levonorgestrel-ethinyl estradiol (AVIANE,ALESSE,LESSINA) 0.1-20 mg-mcg per tablet, Take 1 tablet by mouth once daily., Disp: , Rfl:     Lactobacillus rhamnosus GG (CULTURELLE) 10 billion cell capsule, Take 1 capsule by mouth once daily., Disp: , Rfl:     Review of patient's allergies indicates:  No Known Allergies    History reviewed. No pertinent family history.    Social History     Socioeconomic History    Marital status: Single   Tobacco Use    Smoking status: Never    Smokeless tobacco: Never   Substance and Sexual Activity    Alcohol use: Yes     Comment: socially    Sexual activity: Not Currently     Partners: Male     Social Determinants of Health     Financial Resource Strain: Low Risk  (2/21/2024)    Overall Financial Resource Strain (CARDIA)     Difficulty of Paying Living Expenses: Not hard at all   Food Insecurity: No Food Insecurity (2/21/2024)    Hunger Vital Sign     Worried About Running Out of Food in the Last Year: Never true     Ran Out of Food in the Last Year: Never true   Transportation Needs: No Transportation Needs (2/21/2024)    PRAPARE - Transportation     Lack of Transportation (Medical): No      "Lack of Transportation (Non-Medical): No   Physical Activity: Sufficiently Active (2/21/2024)    Exercise Vital Sign     Days of Exercise per Week: 5 days     Minutes of Exercise per Session: 30 min   Stress: No Stress Concern Present (2/21/2024)    [employer] [place] [employer] - Occupational Stress Questionnaire     Feeling of Stress : Only a little   Social Connections: Unknown (2/21/2024)    Social Connection and Isolation Panel [NHANES]     Frequency of Communication with Friends and Family: More than three times a week     Frequency of Social Gatherings with Friends and Family: More than three times a week     Active Member of Clubs or Organizations: No     Attends Club or Organization Meetings: Patient declined     Marital Status: Never    Housing Stability: Low Risk  (2/21/2024)    Housing Stability Vital Sign     Unable to Pay for Housing in the Last Year: No     Number of Places Lived in the Last Year: 2     Unstable Housing in the Last Year: No       OB History   No obstetric history on file.          COMPREHENSIVE GYN HISTORY:  PAP History: Denies abnormal Paps.  Infection History: Denies STDs. Denies PID.  Benign History: Denies uterine fibroids. Denies ovarian cysts. Denies endometriosis. Denies other conditions.  Cancer History: Denies cervical cancer. Denies uterine cancer or hyperplasia. Denies ovarian cancer. Denies vulvar cancer or pre-cancer. Denies vaginal cancer or pre-cancer. Denies breast cancer. Denies colon cancer.      ROS:  GENERAL: No weight changes. No swelling. No fatigue. No fever.  BREASTS: No pain. No lumps. No discharge.  ABDOMEN: No pain. No nausea. No vomiting. No diarrhea. No constipation.  REPRODUCTIVE: No abnormal bleeding. No pelvic pain.   VULVA: No pain. No lesions. No itching.  VAGINA: No relaxation. No itching. No odor. No discharge. No lesions.  URINARY: No incontinence. No nocturia. No frequency. No dysuria.    /64   Ht 5' 4" (1.626 " m)   Wt 63 kg (138 lb 14.2 oz)   LMP 03/09/2024   BMI 23.84 kg/m²     PE:  Physical Exam:   Constitutional: She is oriented to person, place, and time. She appears well-developed and well-nourished. No distress.    HENT:   Head: Normocephalic and atraumatic.    Eyes: Pupils are equal, round, and reactive to light. EOM are normal.     Cardiovascular:  Normal rate and regular rhythm.             Pulmonary/Chest: Effort normal and breath sounds normal. No respiratory distress. She exhibits no mass, no tenderness, no laceration, no edema, no deformity, no swelling and no retraction. Right breast exhibits no inverted nipple, no mass, no nipple discharge, no skin change, no tenderness, no bleeding and no swelling. Left breast exhibits no inverted nipple, no mass, no nipple discharge, no skin change, no tenderness, no bleeding and no swelling.        Abdominal: Soft. There is no abdominal tenderness.     Genitourinary:    Inguinal canal, urethra, bladder, vagina, uterus, right adnexa and left adnexa normal.      Pelvic exam was performed with patient supine.   The external female genitalia was normal.   No external genitalia lesions identified,  Labial bartholins normal.There is no rash, tenderness or lesion on the right labia. There is no rash, tenderness or lesion on the left labia. Cervix is normal. Right adnexum displays no mass, no tenderness and no fullness. Left adnexum displays no mass, no tenderness and no fullness. Vagina exhibits no lesion. No erythema, vaginal discharge, tenderness or bleeding in the vagina.    No signs of injury in the vagina.   Cervix exhibits no motion tenderness, no lesion, no discharge, no friability, no tenderness and no polyp.    pap smear completedUterus consistancy normal and Uerus contour normal  Uterus is not enlarged and not tender. Normal urethral meatus.          Musculoskeletal: Normal range of motion and moves all extremeties. No tenderness.       Neurological: She is alert  and oriented to person, place, and time.    Skin: Skin is warm and dry.    Psychiatric: She has a normal mood and affect. Judgment and thought content normal.      PROCEDURES/ORDERS:  Pap      Assessment/Plan:    Well woman exam  -     Liquid-Based Pap Smear, Screening    Hx of Hashimoto thyroiditis  -     TSH; Future; Expected date: 04/04/2024  -     T3, FREE; Future; Expected date: 04/04/2024    Cervical cancer screening  -     Liquid-Based Pap Smear, Screening        COUNSELING:  The patient was counseled today on:  -A.C.S. Pap and pelvic exam guidelines, recomendations for yearly mammogram, monthly self breast exams and to follow up with her PCP for other health maintenance.    FOLLOW-UP  annually for [employer].

## 2024-04-10 LAB
FINAL PATHOLOGIC DIAGNOSIS: NORMAL
Lab: NORMAL

## 2024-05-13 ENCOUNTER — HOSPITAL ENCOUNTER (OUTPATIENT)
Dept: RADIOLOGY | Facility: HOSPITAL | Age: 24
Discharge: HOME | End: 2024-05-13
Attending: NURSE PRACTITIONER
Payer: COMMERCIAL

## 2024-05-13 DIAGNOSIS — N39.0 URINARY TRACT INFECTION WITHOUT HEMATURIA, SITE UNSPECIFIED: ICD-10-CM

## 2024-05-13 DIAGNOSIS — N12 PYELONEPHRITIS: ICD-10-CM

## 2024-05-13 PROCEDURE — 76770 US EXAM ABDO BACK WALL COMP: CPT | Mod: TC

## 2024-05-13 PROCEDURE — 76770 US EXAM ABDO BACK WALL COMP: CPT | Mod: 26,,, | Performed by: RADIOLOGY

## 2024-06-07 ENCOUNTER — OFFICE VISIT (OUTPATIENT)
Dept: UROLOGY | Facility: CLINIC | Age: 24
End: 2024-06-07
Payer: COMMERCIAL

## 2024-06-07 DIAGNOSIS — N20.0 NEPHROLITHIASIS: ICD-10-CM

## 2024-06-07 DIAGNOSIS — N12 PYELONEPHRITIS: Primary | ICD-10-CM

## 2024-06-07 DIAGNOSIS — N39.0 URINARY TRACT INFECTION WITHOUT HEMATURIA, SITE UNSPECIFIED: ICD-10-CM

## 2024-06-07 LAB
BILIRUBIN, UA POC OHS: NEGATIVE
BLOOD, UA POC OHS: NEGATIVE
CLARITY, UA POC OHS: CLEAR
COLOR, UA POC OHS: YELLOW
GLUCOSE, UA POC OHS: NEGATIVE
KETONES, UA POC OHS: NEGATIVE
LEUKOCYTES, UA POC OHS: NEGATIVE
NITRITE, UA POC OHS: NEGATIVE
PH, UA POC OHS: 5.5
PROTEIN, UA POC OHS: NEGATIVE
SPECIFIC GRAVITY, UA POC OHS: 1.02
UROBILINOGEN, UA POC OHS: 0.2

## 2024-06-07 PROCEDURE — 1159F MED LIST DOCD IN RCRD: CPT | Mod: CPTII,S$GLB,, | Performed by: NURSE PRACTITIONER

## 2024-06-07 PROCEDURE — 1160F RVW MEDS BY RX/DR IN RCRD: CPT | Mod: CPTII,S$GLB,, | Performed by: NURSE PRACTITIONER

## 2024-06-07 PROCEDURE — 99999 PR PBB SHADOW E&M-EST. PATIENT-LVL III: CPT | Mod: PBBFAC,,, | Performed by: NURSE PRACTITIONER

## 2024-06-07 PROCEDURE — 99213 OFFICE O/P EST LOW 20 MIN: CPT | Mod: S$GLB,,, | Performed by: NURSE PRACTITIONER

## 2024-06-07 PROCEDURE — 81003 URINALYSIS AUTO W/O SCOPE: CPT | Mod: QW,S$GLB,, | Performed by: NURSE PRACTITIONER

## 2024-06-07 RX ORDER — CITALOPRAM 20 MG/1
TABLET, FILM COATED ORAL
COMMUNITY
Start: 2024-04-06

## 2024-06-07 NOTE — PROGRESS NOTES
[facility] Note  Staff: ARTEM Christianson    PCP: N/A    Chief Complaint: Follow-up-UTI/Pyelo    Subjective:        HPI: Jeanie Nava is a 23 y.o. female presents today in office for routine recheck of her prior LUTS.     HX:  Pt presented as a NP on 3/4/24 for ER f/up regarding UTI and Pyelonephritis symptoms.     OV 3/4/24:  Pt initially was evaluated 2-3 weeks ago at [facility] for UTI and was treated with Augmentin at the time.     Pt's UTI symptoms worsened with fever, therefore was evaluated in ER on 2/22/24 for further treatment.  CT RSS was performed on 2/22/24 which showed:  Perinephric stranding about the right kidney proximal segment of the right ureter. Correlate with evidence of urinary tract infection/pyelonephritis.  No discrete renal or ureteral calculi identified.    UA showed moderate leukocytes, trace-intact blood (pt on menses)  No gross hematuria  Currently c/o Bladder spasms/pain with urination.  History of Kidney Stones?:  None  Constipation issues?:  None  Currently sexually active    OV 6/7/24:  After last OV, pt was found to have +UTI and was treated with Levaquin.  Pt RTC today for recheck.  UA today in office is WNL  Pt overall feels improved regarding  symptoms.  No dysuria  No gross hematuria    Repeat Imaging:  Renal/Bladder US on 5/13/24 showed small bilateral renal stones.  No Hydronephrosis.    REVIEW OF SYSTEMS:  A comprehensive 10 system review was performed and is negative except as noted above in HPI    PMHx:  Past Medical History:   Diagnosis Date    Hashimoto's disease 2012    Kidney stone     Urinary tract infection      PSHx:  Past Surgical History:   Procedure Laterality Date    WISDOM TOOTH EXTRACTION       Allergies:  Patient has no known allergies.    Medications: reviewed   Objective:   There were no vitals filed for this visit.    Physical Exam  Constitutional:       Appearance: She is well-developed.   HENT:      Head: Normocephalic  and atraumatic.   Eyes:      Conjunctiva/sclera: Conjunctivae normal.      Pupils: Pupils are equal, round, and reactive to light.   Cardiovascular:      Rate and Rhythm: Normal rate and regular rhythm.      Heart sounds: Normal heart sounds.   Pulmonary:      Effort: Pulmonary effort is normal.      Breath sounds: Normal breath sounds.   Abdominal:      General: Bowel sounds are normal.      Palpations: Abdomen is soft.   Musculoskeletal:         General: Normal range of motion.      Cervical back: Normal range of motion and neck supple.   Skin:     General: Skin is warm and dry.   Neurological:      Mental Status: She is alert and oriented to person, place, and time.      Deep Tendon Reflexes: Reflexes are normal and symmetric.   Psychiatric:         Behavior: Behavior normal.         Thought Content: Thought content normal.         Judgment: Judgment normal.       Assessment:       1. Pyelonephritis    2. Urinary tract infection without hematuria, site unspecified    3. Nephrolithiasis          Plan:     Urine WNL today, pt symptoms are gone from last OV and improved at this time.    Instructions on UTI prevention was reviewed with pt today.    Things you can do to decrease your risk of recurring stones:  Decrease her Tea intake and avoid her daily Vitamin C supplement if she is able.  1. Increase fluid intake - You should be producing at least 2.5 L of urine per 24 hour period. If your urine is dark you need to be drinking more water.  2. Lower sodium intake - this helps lower the amount of calcium being lost in your urine. An ideal intake is between 2300 and 3300mg of sodium daily.  3. Normal calcium diet - ideal intake is between 800 and 1200mg of calcium daily. You do not want to decrease the amount of calcium you take in because this can actually increase your risk of making stone.     Limit iced tea and celia,  avoid Tums and Rolaids, to avoid Vitamin C supplementation and to limit salt and red meat intake.       RTC in six months.  Recall letter completed.      [facility]: Active    [employer], MOY-C

## 2024-10-22 ENCOUNTER — OFFICE VISIT (OUTPATIENT)
Dept: GASTROENTEROLOGY | Facility: CLINIC | Age: 24
End: 2024-10-22
Payer: COMMERCIAL

## 2024-10-22 ENCOUNTER — HOSPITAL ENCOUNTER (OUTPATIENT)
Dept: RADIOLOGY | Facility: HOSPITAL | Age: 24
Discharge: HOME | End: 2024-10-22
Payer: COMMERCIAL

## 2024-10-22 VITALS
SYSTOLIC BLOOD PRESSURE: 136 MMHG | BODY MASS INDEX: 22.63 KG/M2 | DIASTOLIC BLOOD PRESSURE: 82 MMHG | HEART RATE: 92 BPM | WEIGHT: 131.81 LBS

## 2024-10-22 DIAGNOSIS — R10.10 UPPER ABDOMINAL PAIN: ICD-10-CM

## 2024-10-22 DIAGNOSIS — R10.10 UPPER ABDOMINAL PAIN: Primary | ICD-10-CM

## 2024-10-22 DIAGNOSIS — R11.0 NAUSEA: ICD-10-CM

## 2024-10-22 PROCEDURE — 76700 US EXAM ABDOM COMPLETE: CPT | Mod: 26,,, | Performed by: RADIOLOGY

## 2024-10-22 PROCEDURE — 3079F DIAST BP 80-89 MM HG: CPT | Mod: CPTII,S$GLB,,

## 2024-10-22 PROCEDURE — 99999 PR PBB SHADOW E&M-EST. PATIENT-LVL III: CPT | Mod: PBBFAC,,,

## 2024-10-22 PROCEDURE — 1160F RVW MEDS BY RX/DR IN RCRD: CPT | Mod: CPTII,S$GLB,,

## 2024-10-22 PROCEDURE — 1159F MED LIST DOCD IN RCRD: CPT | Mod: CPTII,S$GLB,,

## 2024-10-22 PROCEDURE — 3008F BODY MASS INDEX DOCD: CPT | Mod: CPTII,S$GLB,,

## 2024-10-22 PROCEDURE — 76700 US EXAM ABDOM COMPLETE: CPT | Mod: TC

## 2024-10-22 PROCEDURE — 99204 OFFICE O/P NEW MOD 45 MIN: CPT | Mod: S$GLB,,,

## 2024-10-22 PROCEDURE — 3075F SYST BP GE 130 - 139MM HG: CPT | Mod: CPTII,S$GLB,,

## 2024-10-22 RX ORDER — PANTOPRAZOLE SODIUM 40 MG/1
40 TABLET, DELAYED RELEASE ORAL DAILY
Qty: 90 TABLET | Refills: 1 | Status: SHIPPED | OUTPATIENT
Start: 2024-10-22 | End: 2025-04-20

## 2024-10-22 NOTE — PATIENT INSTRUCTIONS
..  Instructions for Outpatient Endoscopy    PROCEDURE DATE: 12/03/24  REPORT TO [facility] ([address] [place] [address] Merari Dias. [id number]) Your arrival time will be provided by the ENDO department about 1 week prior to your procedure date. If you do not hear from them, they can be reached at [phone] Mon- Friday 8a-2p.      NO BLOOD THINNERS/NO ASPIRIN OR MEDICATIONS CONTAINING ASPIRIN, MOTRIN, IBUPROFEN, ALEVE OR ANY ANTI-INFLAMMATORY MEDICATIONS FOR 7 DAYS PRIOR TO PROCEDURE. TYLENOL IS OK.      Eat a light evening meal the night before your Endoscopy          (Soup, Salad, Clyde, or grilled chicken)     Nothing by mouth after midnight or the morning of EGD.                 Ok to take morning medications with small sip water by 5:30am (except no Diabetic medications)              SINCE SEDATION IS USED, YOU MUST HAVE SOMEONE TO DRIVE YOU HOME/NO TAXI

## 2024-10-22 NOTE — PROGRESS NOTES
Subjective:       Patient ID: Jeanie Nava is a 24 y.o. female Body mass index is 22.63 kg/m².    Chief Complaint: abdominal pain    This patient is new to me.  Referring Provider: Dejaerral Self for abdominal pain       Abdominal Pain  This is a new (started a week ago) problem. The problem occurs daily. The problem has been waxing and waning. The pain is located in the epigastric region. The pain is at a severity of 4/10. The quality of the pain is sharp. The abdominal pain radiates to the LUQ and RUQ. Associated symptoms include nausea (mild nausea with pain). Pertinent negatives include no anorexia, arthralgias, belching, constipation, diarrhea, dysuria, fever, flatus, frequency, headaches, hematochezia, hematuria, melena, myalgias, vomiting or weight loss. Associated symptoms comments: Pt with hx of hashimoto's kidney stones presents to clinic for abdominal pain, Has not had an EGD or colonoscopy in the past,  denies any family hx of colon cancer reports maternal grandmother had pancreatic cancer, denies rectal bleeding or changes in bowel habits. Has a bm daily rates stool type 5 on bristol stool scale. The pain is aggravated by eating (pain will start around noon). The pain is relieved by Certain positions (laying on left side). Treatments tried: heating pad, gasx, and tums did not help. There is no history of abdominal surgery, colon cancer, Crohn's disease, gallstones, GERD, irritable bowel syndrome, pancreatitis, PUD or ulcerative colitis. Patient's medical history does not include kidney stones and UTI.       Review of Systems   Constitutional:  Negative for fever and weight loss.   Gastrointestinal:  Positive for abdominal pain and nausea (mild nausea with pain). Negative for abdominal distention, anal bleeding, anorexia, blood in stool, constipation, diarrhea, flatus, hematochezia, melena, rectal pain and vomiting.   Genitourinary:  Negative for dysuria, frequency and hematuria.   Musculoskeletal:   Negative for arthralgias and myalgias.   Neurological:  Negative for headaches.         No LMP recorded.  Past Medical History:   Diagnosis Date    Hashimoto's disease 2012    Kidney stone     Urinary tract infection      Past Surgical History:   Procedure Laterality Date    WISDOM TOOTH EXTRACTION       Family History   Problem Relation Name Age of Onset    Colon cancer Neg Hx      Crohn's disease Neg Hx      Stomach cancer Neg Hx      Ulcerative colitis Neg Hx       Social History     Tobacco Use    Smoking status: Never    Smokeless tobacco: Never   Substance Use Topics    Alcohol use: Yes     Comment: socially     Wt Readings from Last 10 Encounters:   10/22/24 59.8 kg (131 lb 13.4 oz)   04/04/24 63 kg (138 lb 14.2 oz)   03/04/24 60.3 kg (132 lb 15 oz)   02/22/24 59 kg (130 lb)   09/15/23 59 kg (130 lb)   11/14/22 59 kg (130 lb)     Lab Results   Component Value Date    WBC 7.12 02/22/2024    HGB 13.1 02/22/2024    HCT 40.7 02/22/2024    MCV 96 02/22/2024     02/22/2024     CMP  Sodium   Date Value Ref Range Status   02/22/2024 137 136 - 145 mmol/L Final     Potassium   Date Value Ref Range Status   02/22/2024 4.0 3.5 - 5.1 mmol/L Final     Chloride   Date Value Ref Range Status   02/22/2024 104 95 - 110 mmol/L Final     CO2   Date Value Ref Range Status   02/22/2024 25 23 - 29 mmol/L Final     Glucose   Date Value Ref Range Status   02/22/2024 74 70 - 110 mg/dL Final     BUN   Date Value Ref Range Status   02/22/2024 11 6 - 20 mg/dL Final     Creatinine   Date Value Ref Range Status   02/22/2024 0.7 0.5 - 1.4 mg/dL Final     Calcium   Date Value Ref Range Status   02/22/2024 9.6 8.7 - 10.5 mg/dL Final     Total Protein   Date Value Ref Range Status   02/22/2024 8.2 6.0 - 8.4 g/dL Final     Albumin   Date Value Ref Range Status   02/22/2024 4.7 3.5 - 5.2 g/dL Final     Total Bilirubin   Date Value Ref Range Status   02/22/2024 0.4 0.1 - 1.0 mg/dL Final     Comment:     For infants and newborns,  "interpretation of results should be based  on gestational age, weight and in agreement with clinical  observations.    Premature Infant recommended reference ranges:  Up to 24 hours.............<8.0 mg/dL  Up to 48 hours............<12.0 mg/dL  3-5 days..................<15.0 mg/dL  6-29 days.................<15.0 mg/dL       Alkaline Phosphatase   Date Value Ref Range Status   02/22/2024 44 (L) 55 - 135 U/L Final     AST   Date Value Ref Range Status   02/22/2024 15 10 - 40 U/L Final     ALT   Date Value Ref Range Status   02/22/2024 9 (L) 10 - 44 U/L Final     Anion Gap   Date Value Ref Range Status   02/22/2024 8 8 - 16 mmol/L Final     No results found for: "LIPASE"  No results found for: "LIPASERES"  Lab Results   Component Value Date    TSH 1.308 04/04/2024       Reviewed prior medical records including radiology report of CT renal stone study 2/22/24 & endoscopy history (see surgical history/procedures).    Objective:      Physical Exam  Vitals and nursing note reviewed.   Constitutional:       Appearance: Normal appearance. She is normal weight.   Cardiovascular:      Rate and Rhythm: Normal rate and regular rhythm.      Heart sounds: Normal heart sounds.   Pulmonary:      Breath sounds: Normal breath sounds.   Abdominal:      General: Bowel sounds are normal.      Palpations: Abdomen is soft.      Tenderness: There is abdominal tenderness in the right upper quadrant.   Skin:     General: Skin is warm and dry.      Coloration: Skin is not jaundiced.   Neurological:      Mental Status: She is alert and oriented to person, place, and time.   Psychiatric:         Mood and Affect: Mood normal.         Behavior: Behavior normal.         Assessment:       1. Upper abdominal pain    2. Nausea        Plan:       Upper abdominal pain  -     Comprehensive Metabolic Panel; Future; Expected date: 10/22/2024  -     Lipase; Future; Expected date: 10/22/2024  -   START  pantoprazole (PROTONIX) 40 MG tablet; Take 1 tablet " (40 mg total) by mouth once daily.  Dispense: 90 tablet; Refill: 1  -     US Abdomen Complete; Future; Expected date: 10/22/2024  -     Case Request Endoscopy: EGD (ESOPHAGOGASTRODUODENOSCOPY)  - schedule EGD, discussed procedure with patient, including risks and benefits, patient verbalized understanding  -consider carafate  -follow GERD lifestyle modifications    Nausea  -     Case Request Endoscopy: EGD (ESOPHAGOGASTRODUODENOSCOPY)  - schedule EGD, discussed procedure with patient, including risks and benefits, patient verbalized understanding  -consider zofran  - Trial of PPI      Follow up if symptoms worsen or fail to improve.      If no improvement in symptoms or symptoms worsen, call/follow-up at clinic or go to ER.       [facility] - GASTRO[facility]OLOG[facility]  [facility] [place]     Dictation software program was used for this note. Please expect some simple typographical  errors in this note.    Encounter includes face to face time and non-face to face time preparing to see the patient (eg, review of tests), obtaining and/or reviewing separately obtained history, documenting clinical information in the electronic or other health record, independently interpreting results (not separately reported) and communicating results to the patient/family/caregiver, or care coordination (not separately reported).

## 2024-11-12 ENCOUNTER — PATIENT MESSAGE (OUTPATIENT)
Dept: GASTROENTEROLOGY | Facility: CLINIC | Age: 24
End: 2024-11-12
Payer: COMMERCIAL

## 2025-04-05 ENCOUNTER — OFFICE VISIT (OUTPATIENT)
Dept: URGENT CARE | Facility: CLINIC | Age: 25
End: 2025-04-05
Payer: COMMERCIAL

## 2025-04-05 VITALS
DIASTOLIC BLOOD PRESSURE: 93 MMHG | HEIGHT: 64 IN | SYSTOLIC BLOOD PRESSURE: 132 MMHG | RESPIRATION RATE: 19 BRPM | BODY MASS INDEX: 22.2 KG/M2 | OXYGEN SATURATION: 99 % | TEMPERATURE: 98 F | HEART RATE: 89 BPM | WEIGHT: 130 LBS

## 2025-04-05 DIAGNOSIS — M79.642 LEFT HAND PAIN: Primary | ICD-10-CM

## 2025-04-05 PROCEDURE — 99214 OFFICE O/P EST MOD 30 MIN: CPT | Mod: S$GLB,,,

## 2025-04-05 PROCEDURE — 73130 X-RAY EXAM OF HAND: CPT | Mod: LT,S$GLB,, | Performed by: RADIOLOGY

## 2025-04-05 RX ORDER — CITALOPRAM 40 MG/1
20 TABLET, FILM COATED ORAL DAILY
COMMUNITY
Start: 2024-10-19

## 2025-04-05 NOTE — PROGRESS NOTES
"Subjective:      Patient ID: Jeanie Nava is a 24 y.o. female.    Vitals:  height is 5' 4" (1.626 m) and weight is 59 kg (130 lb). Her oral temperature is 98.3 °F (36.8 °C). Her blood pressure is 132/93 (abnormal) and her pulse is 89. Her respiration is 19 and oxygen saturation is 99%.     Chief Complaint: Hand Injury    In clinic for evaluation of left hand pain for 1 week.  States had hand stuck in a canopy tent while trying to disassemble.  Initially she had pain in the thenar eminence of the left thumb, now pains in the web portion between the thumb and index finger.  Pain radiates into the index finger.  Describes it as achy.  No numbness or tingling.  Range of motions intact.  She has a right-hand dominant    Hand Injury   Her dominant hand is their right hand. The incident occurred more than 1 week ago. The incident occurred at home. The injury mechanism was a direct blow. The pain is present in the left fingers and left hand. The quality of the pain is described as aching and stabbing. The pain does not radiate. The pain is at a severity of 6/10. The pain is moderate. The pain has been Constant since the incident. The symptoms are aggravated by movement. She has tried ice and NSAIDs for the symptoms. The treatment provided mild relief.       Musculoskeletal:  Positive for pain, joint pain and joint swelling. Negative for abnormal ROM of joint.   Skin:  Positive for bruising.      Objective:     Physical Exam   Constitutional: She is oriented to person, place, and time. She is cooperative.   HENT:   Head: Normocephalic and atraumatic.   Ears:   Right Ear: External ear normal.   Left Ear: External ear normal.   Nose: Nose normal.   Mouth/Throat: Uvula is midline, oropharynx is clear and moist and mucous membranes are normal. Mucous membranes are moist. Oropharynx is clear.   Eyes: Conjunctivae and lids are normal. Pupils are equal, round, and reactive to light. Extraocular movement intact   Neck: Trachea " normal and phonation normal. Neck supple.   Cardiovascular: Normal rate, regular rhythm, normal heart sounds and normal pulses.   Pulmonary/Chest: Effort normal and breath sounds normal.   Abdominal: Normal appearance.   Musculoskeletal:        Hands:    Neurological: no focal deficit. She is alert, oriented to person, place, and time and at baseline. She has normal motor skills, normal sensation and intact cranial nerves (2-12). Gait and coordination normal. GCS eye subscore is 4. GCS verbal subscore is 5. GCS motor subscore is 6.   Skin: Skin is warm and dry. Capillary refill takes 2 to 3 seconds.   Psychiatric: She experiences Normal attention and Normal perception. Her speech is normal and behavior is normal. Mood, judgment and thought content normal.   Nursing note and vitals reviewed.      Assessment:     1. Left hand pain        Plan:       Left hand pain  -     XR HAND COMPLETE 3 VIEW LEFT; Future; Expected date: 04/05/2025

## 2025-04-05 NOTE — PATIENT INSTRUCTIONS
Protect further injury, rest, ice, compress, elevate, avoid overuse.  Motrin or Tylenol as needed.

## 2025-06-23 ENCOUNTER — OFFICE VISIT (OUTPATIENT)
Dept: OBSTETRICS AND GYNECOLOGY | Facility: CLINIC | Age: 25
End: 2025-06-23
Payer: COMMERCIAL

## 2025-06-23 ENCOUNTER — LAB VISIT (OUTPATIENT)
Dept: LAB | Facility: HOSPITAL | Age: 25
End: 2025-06-23
Payer: COMMERCIAL

## 2025-06-23 VITALS
DIASTOLIC BLOOD PRESSURE: 76 MMHG | WEIGHT: 140 LBS | HEIGHT: 64 IN | SYSTOLIC BLOOD PRESSURE: 132 MMHG | BODY MASS INDEX: 23.9 KG/M2

## 2025-06-23 DIAGNOSIS — N92.1 BREAKTHROUGH BLEEDING ON BIRTH CONTROL PILLS: ICD-10-CM

## 2025-06-23 DIAGNOSIS — Z01.419 WELL WOMAN EXAM WITH ROUTINE GYNECOLOGICAL EXAM: Primary | ICD-10-CM

## 2025-06-23 DIAGNOSIS — Z11.3 SCREEN FOR STD (SEXUALLY TRANSMITTED DISEASE): ICD-10-CM

## 2025-06-23 DIAGNOSIS — Z01.419 WELL WOMAN EXAM WITH ROUTINE GYNECOLOGICAL EXAM: ICD-10-CM

## 2025-06-23 DIAGNOSIS — Z86.39 HX OF HASHIMOTO THYROIDITIS: ICD-10-CM

## 2025-06-23 DIAGNOSIS — Z30.40 ENCOUNTER FOR SURVEILLANCE OF CONTRACEPTIVES, UNSPECIFIED CONTRACEPTIVE: ICD-10-CM

## 2025-06-23 LAB
ABSOLUTE EOSINOPHIL (OHS): 0.04 K/UL
ABSOLUTE MONOCYTE (OHS): 0.68 K/UL (ref 0.3–1)
ABSOLUTE NEUTROPHIL COUNT (OHS): 3.59 K/UL (ref 1.8–7.7)
ALBUMIN SERPL BCP-MCNC: 4.2 G/DL (ref 3.5–5.2)
ALP SERPL-CCNC: 50 UNIT/L (ref 40–150)
ALT SERPL W/O P-5'-P-CCNC: 78 UNIT/L (ref 10–44)
ANION GAP (OHS): 10 MMOL/L (ref 8–16)
AST SERPL-CCNC: 63 UNIT/L (ref 11–45)
BASOPHILS # BLD AUTO: 0.1 K/UL
BASOPHILS NFR BLD AUTO: 1.6 %
BILIRUB SERPL-MCNC: 0.3 MG/DL (ref 0.1–1)
BUN SERPL-MCNC: 12 MG/DL (ref 6–20)
CALCIUM SERPL-MCNC: 9.5 MG/DL (ref 8.7–10.5)
CHLORIDE SERPL-SCNC: 107 MMOL/L (ref 95–110)
CO2 SERPL-SCNC: 25 MMOL/L (ref 23–29)
CREAT SERPL-MCNC: 0.9 MG/DL (ref 0.5–1.4)
ERYTHROCYTE [DISTWIDTH] IN BLOOD BY AUTOMATED COUNT: 13.3 % (ref 11.5–14.5)
GFR SERPLBLD CREATININE-BSD FMLA CKD-EPI: >60 ML/MIN/1.73/M2
GLUCOSE SERPL-MCNC: 66 MG/DL (ref 70–110)
HAV IGM SERPL QL IA: NORMAL
HBV CORE IGM SERPL QL IA: NORMAL
HBV SURFACE AG SERPL QL IA: NORMAL
HCT VFR BLD AUTO: 39.9 % (ref 37–48.5)
HCV AB SERPL QL IA: NORMAL
HGB BLD-MCNC: 12.6 GM/DL (ref 12–16)
HIV 1+2 AB+HIV1 P24 AG SERPL QL IA: NORMAL
IMM GRANULOCYTES # BLD AUTO: 0.01 K/UL (ref 0–0.04)
IMM GRANULOCYTES NFR BLD AUTO: 0.2 % (ref 0–0.5)
LYMPHOCYTES # BLD AUTO: 1.92 K/UL (ref 1–4.8)
MCH RBC QN AUTO: 31 PG (ref 27–31)
MCHC RBC AUTO-ENTMCNC: 31.6 G/DL (ref 32–36)
MCV RBC AUTO: 98 FL (ref 82–98)
NUCLEATED RBC (/100WBC) (OHS): 0 /100 WBC
PLATELET # BLD AUTO: 309 K/UL (ref 150–450)
PMV BLD AUTO: 10 FL (ref 9.2–12.9)
POTASSIUM SERPL-SCNC: 3.9 MMOL/L (ref 3.5–5.1)
PROT SERPL-MCNC: 7.2 GM/DL (ref 6–8.4)
RBC # BLD AUTO: 4.06 M/UL (ref 4–5.4)
RELATIVE EOSINOPHIL (OHS): 0.6 %
RELATIVE LYMPHOCYTE (OHS): 30.3 % (ref 18–48)
RELATIVE MONOCYTE (OHS): 10.7 % (ref 4–15)
RELATIVE NEUTROPHIL (OHS): 56.6 % (ref 38–73)
SODIUM SERPL-SCNC: 142 MMOL/L (ref 136–145)
TSH SERPL-ACNC: 0.99 UIU/ML (ref 0.4–4)
WBC # BLD AUTO: 6.34 K/UL (ref 3.9–12.7)

## 2025-06-23 PROCEDURE — 3075F SYST BP GE 130 - 139MM HG: CPT | Mod: CPTII,S$GLB,,

## 2025-06-23 PROCEDURE — 99395 PREV VISIT EST AGE 18-39: CPT | Mod: S$GLB,,,

## 2025-06-23 PROCEDURE — 99999 PR PBB SHADOW E&M-EST. PATIENT-LVL III: CPT | Mod: PBBFAC,,,

## 2025-06-23 PROCEDURE — 1159F MED LIST DOCD IN RCRD: CPT | Mod: CPTII,S$GLB,,

## 2025-06-23 PROCEDURE — 82040 ASSAY OF SERUM ALBUMIN: CPT

## 2025-06-23 PROCEDURE — 85025 COMPLETE CBC W/AUTO DIFF WBC: CPT

## 2025-06-23 PROCEDURE — 87491 CHLMYD TRACH DNA AMP PROBE: CPT

## 2025-06-23 PROCEDURE — 36415 COLL VENOUS BLD VENIPUNCTURE: CPT | Mod: PO

## 2025-06-23 PROCEDURE — 84443 ASSAY THYROID STIM HORMONE: CPT

## 2025-06-23 PROCEDURE — 87389 HIV-1 AG W/HIV-1&-2 AB AG IA: CPT

## 2025-06-23 PROCEDURE — 86592 SYPHILIS TEST NON-TREP QUAL: CPT

## 2025-06-23 PROCEDURE — 80074 ACUTE HEPATITIS PANEL: CPT

## 2025-06-23 PROCEDURE — 87661 TRICHOMONAS VAGINALIS AMPLIF: CPT

## 2025-06-23 PROCEDURE — 3078F DIAST BP <80 MM HG: CPT | Mod: CPTII,S$GLB,,

## 2025-06-23 PROCEDURE — 3008F BODY MASS INDEX DOCD: CPT | Mod: CPTII,S$GLB,,

## 2025-06-23 RX ORDER — NORELGESTROMIN AND ETHINYL ESTRADIOL 35; 150 UG/MG; UG/MG
1 PATCH TRANSDERMAL WEEKLY
Qty: 9 PATCH | Refills: 3 | Status: SHIPPED | OUTPATIENT
Start: 2025-06-23

## 2025-06-23 RX ORDER — SERTRALINE HYDROCHLORIDE 100 MG/1
TABLET, FILM COATED ORAL
COMMUNITY
Start: 2025-03-16

## 2025-06-23 NOTE — PROGRESS NOTES
HISTORY OF PRESENT ILLNESS:    Jeanie Nava is a 24 y.o. female, No obstetric history on file., Patient's last menstrual period was 06/12/2025.,  presents for a routine annual exam . Reports some irregular bleeding with COCP for the last 2-3 months. Two periods in a month. One light bleeding and one with normal flow . Has not missed any pills, taking at the same time of the day. No medication changes. Reports she is more stressed recently with purchasing her first home. Interested in contraceptive she does not have to take daily     Last pap:  2024- NILM   Last mammogram: n/a   Last colon ca screening:  n/a   SA: male partner, does use condoms  Contraception: OCP (estrogen/progesterone).    Sexually transmitted infection risk: very low risk of STD exposure.     This is the extent of the patient's complaints at this time.     Past Medical History:   Diagnosis Date    Hashimoto's disease 2012    Kidney stone     Urinary tract infection        Past Surgical History:   Procedure Laterality Date    WISDOM TOOTH EXTRACTION         MEDICATIONS AND ALLERGIES:    Current Medications[1]    Review of patient's allergies indicates:  No Known Allergies    Family History   Problem Relation Name Age of Onset    Colon cancer Neg Hx      Crohn's disease Neg Hx      Stomach cancer Neg Hx      Ulcerative colitis Neg Hx         Social History[2]    OB History   No obstetric history on file.          COMPREHENSIVE GYN HISTORY:  PAP History: Denies abnormal Paps.  Infection History: Denies STDs. Denies PID.  Benign History: Denies uterine fibroids. Denies ovarian cysts. Denies endometriosis. Denies other conditions.  Cancer History: Denies cervical cancer. Denies uterine cancer or hyperplasia. Denies ovarian cancer. Denies vulvar cancer or pre-cancer. Denies vaginal cancer or pre-cancer. Denies breast cancer. Denies colon cancer.      ROS:  GENERAL: No weight changes. No swelling. No fatigue. No fever.  BREASTS: No pain. No lumps. No  "discharge.  ABDOMEN: No pain. No nausea. No vomiting. No diarrhea. No constipation.  REPRODUCTIVE: + abnormal bleeding. No pelvic pain.   VULVA: No pain. No lesions. No itching.  VAGINA: No relaxation. No itching. No odor. No discharge. No lesions.  URINARY: No incontinence. No nocturia. No frequency. No dysuria.    /76 (Patient Position: Sitting)   Ht 5' 4" (1.626 m)   Wt 63.5 kg (139 lb 15.9 oz)   LMP 06/12/2025   BMI 24.03 kg/m²     PE:  Physical Exam     PROCEDURES/ORDERS:  Pap- deferred, current      Assessment/Plan:    Well woman exam with routine gynecological exam  -     CBC Auto Differential; Future; Expected date: 06/23/2025  -     TSH; Future; Expected date: 06/23/2025  -     Comprehensive Metabolic Panel; Future; Expected date: 06/23/2025    Screen for STD (sexually transmitted disease)  -     HIV 1/2 Ag/Ab (4th Gen); Future; Expected date: 06/23/2025  -     HEPATITIS PANEL, ACUTE; Future; Expected date: 06/23/2025  -     RPR; Future; Expected date: 06/23/2025  -     C. trachomatis/N. gonorrhoeae by AMP DNA  -     Trichomonas vaginalis, RNA, Qual    Hx of Hashimoto thyroiditis  -     TSH; Future; Expected date: 06/23/2025    Breakthrough bleeding on birth control pills  -     CBC Auto Differential; Future; Expected date: 06/23/2025  -     TSH; Future; Expected date: 06/23/2025    Encounter for surveillance of contraceptives, unspecified contraceptive  Comments:  Alternate methods discussed. Desires Xulane. Instructed on use. To start when finishes current pill pack  Orders:  -     norelgestromin-ethinyl estradiol (XULANE) 150-35 mcg/24 hr; Place 1 patch onto the skin once a week.  Dispense: 9 patch; Refill: 3        COUNSELING:  The patient was counseled today on:  -A.C.S. Pap and pelvic exam guidelines, recomendations for yearly mammogram, monthly self breast exams and to follow up with her PCP for other health maintenance.    FOLLOW-UP  annually for [employer].          [1]   Current Outpatient " Medications:     levonorgestrel-ethinyl estradiol (AVIANE,ALESSE,LESSINA) 0.1-20 mg-mcg per tablet, Take 1 tablet by mouth once daily., Disp: , Rfl:     sertraline (ZOLOFT) 100 MG tablet, , Disp: , Rfl:     Lactobacillus rhamnosus GG (CULTURELLE) 10 billion cell capsule, Take 1 capsule by mouth once daily. (Patient not taking: Reported on 6/7/2024), Disp: , Rfl:     norelgestromin-ethinyl estradiol (XULANE) 150-35 mcg/24 hr, Place 1 patch onto the skin once a week., Disp: 9 patch, Rfl: 3    pantoprazole (PROTONIX) 40 MG tablet, Take 1 tablet (40 mg total) by mouth once daily. (Patient not taking: Reported on 4/5/2025), Disp: 90 tablet, Rfl: 1  [2]   Social History  Socioeconomic History    Marital status: Single   Tobacco Use    Smoking status: Never    Smokeless tobacco: Never   Substance and Sexual Activity    Alcohol use: Yes     Comment: socially    Sexual activity: Not Currently     Partners: Male     Social Drivers of Health     Financial Resource Strain: Low Risk  (2/21/2024)    Overall Financial Resource Strain (CARDIA)     Difficulty of Paying Living Expenses: Not hard at all   Food Insecurity: No Food Insecurity (2/21/2024)    Hunger Vital Sign     Worried About Running Out of Food in the Last Year: Never true     Ran Out of Food in the Last Year: Never true   Transportation Needs: No Transportation Needs (2/21/2024)    PRAPARE - Transportation     Lack of Transportation (Medical): No     Lack of Transportation (Non-Medical): No   Physical Activity: Sufficiently Active (2/21/2024)    Exercise Vital Sign     Days of Exercise per Week: 5 days     Minutes of Exercise per Session: 30 min   Stress: No Stress Concern Present (2/21/2024)    [employer] [place] [employer] - Occupational Stress Questionnaire     Feeling of Stress : Only a little   Housing Stability: Low Risk  (2/21/2024)    Housing Stability Vital Sign     Unable to Pay for Housing in the Last Year: No     Number of Places Lived in the  Last Year: 2     Unstable Housing in the Last Year: No

## 2025-06-24 ENCOUNTER — RESULTS FOLLOW-UP (OUTPATIENT)
Dept: OBSTETRICS AND GYNECOLOGY | Facility: CLINIC | Age: 25
End: 2025-06-24
Payer: COMMERCIAL

## 2025-06-24 ENCOUNTER — PATIENT MESSAGE (OUTPATIENT)
Dept: OBSTETRICS AND GYNECOLOGY | Facility: CLINIC | Age: 25
End: 2025-06-24
Payer: COMMERCIAL

## 2025-06-24 DIAGNOSIS — R74.8 ELEVATED LIVER ENZYMES: Primary | ICD-10-CM

## 2025-06-24 LAB — RPR SER QL: NORMAL

## 2025-06-25 LAB
C TRACH DNA SPEC QL NAA+PROBE: NOT DETECTED
CTGC SOURCE (OHS) ORD-325: NORMAL
N GONORRHOEA DNA UR QL NAA+PROBE: NOT DETECTED
SPECIMEN SOURCE: NORMAL
T VAGINALIS RRNA SPEC QL NAA+PROBE: NEGATIVE